# Patient Record
Sex: FEMALE | URBAN - NONMETROPOLITAN AREA
[De-identification: names, ages, dates, MRNs, and addresses within clinical notes are randomized per-mention and may not be internally consistent; named-entity substitution may affect disease eponyms.]

---

## 2024-02-07 ENCOUNTER — APPOINTMENT (OUTPATIENT)
Dept: URBAN - NONMETROPOLITAN AREA CLINIC 49 | Age: 44
Setting detail: DERMATOLOGY
End: 2024-02-07

## 2024-02-07 DIAGNOSIS — L66.8 OTHER CICATRICIAL ALOPECIA: ICD-10-CM

## 2024-02-07 PROBLEM — L65.9 NONSCARRING HAIR LOSS, UNSPECIFIED: Status: ACTIVE | Noted: 2024-02-07

## 2024-02-07 PROCEDURE — OTHER BIOPSY BY PUNCH METHOD: OTHER

## 2024-02-07 PROCEDURE — 11104 PUNCH BX SKIN SINGLE LESION: CPT

## 2024-02-07 PROCEDURE — 11105 PUNCH BX SKIN EA SEP/ADDL: CPT

## 2024-02-07 ASSESSMENT — LOCATION ZONE DERM
LOCATION ZONE: FACE
LOCATION ZONE: SCALP

## 2024-02-07 ASSESSMENT — LOCATION SIMPLE DESCRIPTION DERM
LOCATION SIMPLE: SCALP
LOCATION SIMPLE: RIGHT FOREHEAD

## 2024-02-07 ASSESSMENT — LOCATION DETAILED DESCRIPTION DERM
LOCATION DETAILED: RIGHT SUPERIOR PARIETAL SCALP
LOCATION DETAILED: RIGHT SUPERIOR MEDIAL FOREHEAD

## 2024-02-07 NOTE — PROCEDURE: BIOPSY BY PUNCH METHOD

## 2024-02-21 ENCOUNTER — APPOINTMENT (OUTPATIENT)
Dept: URBAN - NONMETROPOLITAN AREA CLINIC 49 | Age: 44
Setting detail: DERMATOLOGY
End: 2024-02-22

## 2024-02-21 DIAGNOSIS — L66.8 OTHER CICATRICIAL ALOPECIA: ICD-10-CM

## 2024-02-21 PROCEDURE — OTHER ADDITIONAL NOTES: OTHER

## 2024-02-21 PROCEDURE — OTHER PRESCRIPTION: OTHER

## 2024-02-21 PROCEDURE — OTHER COUNSELING: OTHER

## 2024-02-21 RX ORDER — FLUOCINOLONE ACETONIDE 0.11 MG/ML
OIL TOPICAL
Qty: 118.28 | Refills: 3 | Status: ERX | COMMUNITY
Start: 2024-02-21

## 2024-02-21 ASSESSMENT — LOCATION SIMPLE DESCRIPTION DERM
LOCATION SIMPLE: SCALP
LOCATION SIMPLE: SUPERIOR FOREHEAD

## 2024-02-21 ASSESSMENT — LOCATION ZONE DERM
LOCATION ZONE: SCALP
LOCATION ZONE: FACE

## 2024-02-21 ASSESSMENT — LOCATION DETAILED DESCRIPTION DERM
LOCATION DETAILED: SUPERIOR MID FOREHEAD
LOCATION DETAILED: RIGHT CENTRAL PARIETAL SCALP

## 2024-02-21 NOTE — PROCEDURE: ADDITIONAL NOTES
Render Risk Assessment In Note?: no
Additional Notes: This is a suture removal visit, so no visit charge.
Detail Level: Simple

## 2024-02-21 NOTE — PROCEDURE: COUNSELING
Detail Level: Detailed
Patient Specific Counseling (Will Not Stick From Patient To Patient): Sutures removed today.  Will start treating with fluocinolone oil QHS x 2 months, then twice a week after that, and topical minoxidil 5% solution/foam as directed on packaging.  Advised treatment may take months to show fruit and may need to be continued indefinitely.

## 2024-08-14 ENCOUNTER — APPOINTMENT (OUTPATIENT)
Dept: URBAN - NONMETROPOLITAN AREA CLINIC 49 | Age: 44
Setting detail: DERMATOLOGY
End: 2024-08-15

## 2024-08-14 DIAGNOSIS — L66.8 OTHER CICATRICIAL ALOPECIA: ICD-10-CM

## 2024-08-14 PROCEDURE — 99213 OFFICE O/P EST LOW 20 MIN: CPT

## 2024-08-14 PROCEDURE — OTHER COUNSELING: OTHER

## 2024-08-14 ASSESSMENT — LOCATION SIMPLE DESCRIPTION DERM: LOCATION SIMPLE: ANTERIOR SCALP

## 2024-08-14 ASSESSMENT — LOCATION DETAILED DESCRIPTION DERM: LOCATION DETAILED: MID-FRONTAL SCALP

## 2024-08-14 ASSESSMENT — LOCATION ZONE DERM: LOCATION ZONE: SCALP

## 2024-08-14 NOTE — PROCEDURE: COUNSELING
Patient Specific Counseling (Will Not Stick From Patient To Patient): Continue Fluocinolone scalp oil once or twice a week to keep itching at bay. Start topical minoxidil 5%.
Detail Level: Zone

## 2025-07-23 ENCOUNTER — APPOINTMENT (OUTPATIENT)
Dept: URBAN - NONMETROPOLITAN AREA CLINIC 49 | Age: 45
Setting detail: DERMATOLOGY
End: 2025-07-24

## 2025-07-23 DIAGNOSIS — L66.81 CENTRAL CENTRIFUGAL CICATRICIAL ALOPECIA: ICD-10-CM

## 2025-07-23 PROCEDURE — OTHER PHOTO-DOCUMENTATION: OTHER

## 2025-07-23 PROCEDURE — OTHER TREATMENT REGIMEN: OTHER

## 2025-07-23 PROCEDURE — OTHER PRESCRIPTION: OTHER

## 2025-07-23 PROCEDURE — 99214 OFFICE O/P EST MOD 30 MIN: CPT

## 2025-07-23 PROCEDURE — OTHER COUNSELING: OTHER

## 2025-07-23 RX ORDER — CICLOPIROX 10 MG/.96ML
SHAMPOO TOPICAL
Qty: 120 | Refills: 5 | Status: ERX | COMMUNITY
Start: 2025-07-23

## 2025-07-23 RX ORDER — FINASTERIDE 1 MG/1
TABLET, FILM COATED ORAL
Qty: 30 | Refills: 2 | Status: ERX | COMMUNITY
Start: 2025-07-23

## 2025-07-23 RX ORDER — CLOBETASOL PROPIONATE 0.5 MG/ML
SOLUTION TOPICAL QHS
Qty: 50 | Refills: 3 | Status: ERX | COMMUNITY
Start: 2025-07-23

## 2025-07-23 ASSESSMENT — LOCATION DETAILED DESCRIPTION DERM
LOCATION DETAILED: LEFT SUPERIOR PARIETAL SCALP
LOCATION DETAILED: RIGHT MEDIAL FRONTAL SCALP

## 2025-07-23 ASSESSMENT — LOCATION ZONE DERM: LOCATION ZONE: SCALP

## 2025-07-23 ASSESSMENT — SEVERITY OF ALOPECIA TOOL: % SCALP HAIR LOST: 40

## 2025-07-23 ASSESSMENT — LOCATION SIMPLE DESCRIPTION DERM
LOCATION SIMPLE: RIGHT SCALP
LOCATION SIMPLE: SCALP